# Patient Record
Sex: MALE | Race: WHITE
[De-identification: names, ages, dates, MRNs, and addresses within clinical notes are randomized per-mention and may not be internally consistent; named-entity substitution may affect disease eponyms.]

---

## 2019-10-26 NOTE — NUR
PT PLEASANT COOP A.O. OFTEN ANXIOUS. FEELS SOB AT TIMES. H/R IRREG. NO  MURMER
NOTED. PER TELE AFLUTTER . CURRENTLY ON CARDIZEM DRIP AT 15. LUNGS
WHEEZY T/O. DIM IN BASES. ON 3L O3 OXIMIZER. BT X4 LAST BM THIS AM. LOOSE PER
PT. ALMOST DIARRHEA. VIODS RAJAN CATH. YELLOW FLUID DRAINING. 1 ASST TO BSC AS
HNEEDED. SOB WITH ACTIVITY. STARTS SPIRAL WHEN SOB AND ANXIETY ESCALATES.
MAKES WORSE. WATCHING H/R THRU TELEMONITOR. BED IN LOW PPOSITION, CALL LITE IN
REACH, CALLS APPROP

## 2019-10-26 NOTE — NUR
SHIFT SUMMARY
ASSUMED CARE OF PT AND RECEIVED REPORT AS PT WAS TRANSFERRED FROM ER AT 0110
HRS. PT ABLE TO TRANSFER SELF, BUT IMPULISVE AND PANICKY IN NATURE AND
MOVEMENT. PT WELCOMED AND ORIENTED TO UNIT AND ROOM WITH NO ISSUES OTHER THAN
SIGNS OF ANXIETY APPROACHING PANIC AT TIMES. PT MEDICATED AND TREATED PER UNIT
PROTOCOL AND MD ORDER, INCLUDING 1 DOSE OF TYLENOL FOR C/O CHRONIC BACK PAIN,
AND CARDIZEM DRIP TO NORMALIZE HEART RATE.  CARDIZEM TITRATED FROM 7 AT
ARRIVAL TO 15 THIS SHIFT; HR NORMALIZED TO 1 HUNDRED-TEENS AND TWENTIES BY
SHIFT CHANGE. PT NEEDED REORIENTING TO SITUATION AND LOCATION ONE TIME WAKING
FROM SLEEP, OTHERWISE ALERT AND ORIENTED, COMPLIANT, AND CONVERSIVE. IT
APPEARS THAT ANXIETY/IMPULSIVITY ARE PT'S BASELINE. PASSED CARE AND REPORT TO
ONCOMING SHIFT W/ PT RESTING IN BED, BED LOCKED AND LOW, CALL LIGHT W/IN
REACH.

## 2019-10-26 NOTE — NUR
PT HAS HAD SEVERAL EPISODES OF PANIC ABOUT "NOW BEING ABLE TO BREATHE" HIS O2
SATS FALL DOWN TO LOW 80"S HE WORKS HARD SHORT BREATHS. IS WHEEZY T/O. TURN O2
UP TO 8-10 ON OXIMITRY. RECOVERS IN 1-2 MIN. THEN TURN DOWN. ANXIETY
SKYROCKETS. RT CALLED, OFTEN BREATHING TX GIVEN. DR HAS BEEN NOTIFIED. PENDING
ATIVAN ORDER TO ATTEMPT TO SEE IF WILL HELP. H/R JUST DROPPED TO 80-90.
TURNED CARDIZEM DRIP DOWN TO 5. PO CARDIZEM AND METOPROLOL HAVE BEEN STARTED.
BED IN LOW POSITOIN, CALL LITE IN REACH, CALLS APPROP

## 2019-10-27 NOTE — NUR
SHIFT SUMMARY
ASSUMED CARE OF PT AT 1900 HRS, PT LYING AWAKE IN BED APPEARING ANXIOUS AND
IMPULSIVE WHICH IS PT'S BASELINE. PT HAD MOMENTS OF ANXIETY TO PANIC T/O THIS
SHIFT REQUIRING INTERVENTION BY NURSING STAFF AND RT. PT WAS SUPPLIED WITH
OXIMIZER AT BEGINNING OF SHIFT WHICH ALLOWED EFFECTIVE TITRATION OF O2 FLOW TO
MEET PT'S NEEDS WITHOUT OVERSATURATION. PT'S CARDIZEM DRIP WAS TURNED FROM
5 MG/HR TO OFF AROUND 2200 HRS AS HIS HR WAS STABLE BELOW 80 BPM. CARDIZEM
DRIP WAS TURNED BACK ON AROUND MIDNIGHT TO 15 MG/HR IN RESPONSE TO
PANIC-ACCENTUATED RAISE IN HR, AND TITRATED TO 10 AROUND 0300 IN RESPONSE TO
HR FALLING TO 90'S - 110'S. PT HAS SIGNIFICANT OXYGEN NEEDS WHEN EXCITED
AND/OR ANXIOUS, WHICH HAPPENS ABRUPTLY T/O THE SHIFT, RESOLVING JUST AS
QUICKLY. QUICK RESPONSE, AND CALM EXPLANATION OF INTERVENTIONS SEEMS TO HAVE A
GREAT PACIFYING EFFECT ON PT'S EMOTIONAL STATE. WILL CONTINUE TO MONITOR AND
WILL PASS CARE AND REPORT TO ONCOMING SHIFT AT 0700. BED LOCKED AND LOW, CALL
LIGHT W/IN REACH, PT AWAKE IN BED WATCHING TV. SATS AT 95%.

## 2019-10-28 NOTE — NUR
ASSUMED CARE APPROXIMATELY 0700; PT ALERT; UP IN CHAIR; TOLERATING HIFLOW
WELL, ADJUSTED TO 45L, 35% PER RT; TYLENOL FOR BACK PAIN, REPOSITIONING, HEAT
PAD AS TOLERATED ; CARDIZEM GTT STOPPED @1120  PER PHYSICIAN ORDERS; NEW
ORDERS FOR EXTENDED RELEASE PO CARDIZEM; PT VSS STABLE; PT SEEMS IMPROVED
SINCE YESTERDAY; RAJAN DRAINING APPROPRIATELY; CALL LIGHT IN REACH; WILL
CONTINUE TO MONITOR AND ASSESS UNTIL HAND OFF TO NOC RN.

## 2019-10-28 NOTE — NUR
UPDATE
PT UP IN CHAIR; VSS; PT SEEMS CONTENT TODAY; PLEASANT AND COMPLIANT W/ CARE;
ANXIOUS AT TIMES AND NEEDS REASSURED WITH ANSWERS TO HIS QUESTIONS; A&O x4;
RAJAN DRAINING YELLOW; WILL CONTINUE TO MONITOR.

## 2019-10-28 NOTE — NUR
Pt visit this evening. Pt reports 7/10 pain in his back and states pain in
normal for him. Pt reports significant anxiety due to being in the hospital
for so long. He is requesting a breathing treatment at this time. Instructed
Pt request will be made to his bedside nurse. Pt is agreeable for continued
visit tomorrow.
 
Spoke with night bedside nurse Judy, relayed Pt's request for breathing
treatment and discussed case.
 
Palliative Care will F/U with Pt tomorrow.

## 2019-10-28 NOTE — NUR
SHIFT SUMMARY
 
PT TOLERATED HIFLO N.C. WELL THROUGH NIGHT, 45 L/MIN, 35% O2 UNLESS
OTHERWISE NOTED BY RESPIRATORY. ANXIETY WELL CONTROLLED WITH GUIDED IMAGERY,
EG. BREATHE IN THROUGH YOUR NOSE, AS SOON AS YOU REACH YOUR PEAK RELEASE THE
BREATH. WHEN BREATHING, FOCUS ON A SLOW, EVEN DRAW, BUT NEVER TO THE POINT OF
DISCOMFORT. O2 SAT DROPS TO 75% WITH ACTIVITY, QUICKLY RECOVERED INTO
MID-90S. TYLENOL ONCE FOR BACK PAIN. VSS. WILL CONTINUE TO MONITOR.

## 2019-10-29 NOTE — NUR
CARE ASSUMPTION
 
PT A&O X4, PLEASANT AND COOPERATIVE. VSS. MONITOR SHOWS AFIB, -110. SPO2
> 92% ON 6L HIGH FLOW CANNULA, TITRATED TO 4L HF W/ PT TOLERATING WELL AT
REST. PT INITIALLY IN RECLINER CHAIR, O2 INCREASED BACK TO 6L FOR AMBULATION
BACK TO BED D/T DESAT TO 85%. PT W/ QUICK RECOVERY TO SPO2 > 92%, O2 TITRATED
BACK TO 4L HF. WILL CONTINUE TO MONITOR AND PROVIDE CARE.

## 2019-10-29 NOTE — NUR
Pt visit this afternoon. Pt sitting in recliner upon arrival. Pt reports 7/10
pain in his back with Pt stating rating is normal for him. Engaged in
therapeutic disucssion regarding Advanced Care Planning. Pt reports living at
[place] in the independent living section. Pt reports using a walker to
assist in ambulation and is independent of his ADL's at [facility]. Pt reports
lately he becomes dyspneic after ambulating 10 feet and requires rest to
recover. Educated Pt on disease process including trajectory of disease.
Educated on the importance of routine conversations with PCP and specialist in
order to plan accordingly. Educated on hospice as an option for future long
term plan. Pt reports no other converns at this time.
 
Palliative Care will remain available.

## 2019-10-29 NOTE — NUR
SHIFT SUMMARY
PT A&Ox3, CALM AND COOPERATIVE WITH CARE. PT RESTING IN BED THIS AM, UP TO
CHAIR THIS AFTERNOON. PT REPORTS LOWER BACK PAIN, LIDOCAIN PATCH IN PLACE AND
MEDICATED x1 WITH TYLENOL WITH POSTIVE RESULTS. PT SOB WITH EXERTION, STARTED
THIS AM ON AIRVO 35L O2 29% FIO2, TRANSITIONED TO 8L O2 VIA NC, AND TITRATED
TO 6L O2 VIA NC. PER H&P PT HAS HOME O2 AT 2L O2 VIA NC. LS WHEEZE T/O. PT
DENIES NAUSEA. AFIB/AFLUTTER 'S T/O SHIFT PER TELEMETRY. VSS. NO OTHER
ACUTE CHANGES NOTED DURING SHIFT. WILL CONTINUE TO MONITOR UNTIL REPORT GIVEN
TO ONCOMING RN.

## 2019-10-29 NOTE — NUR
SHIFT SUMMARY
PT SLEEPING IN ROOM COMFORTABLY AT THIS TIME. NO ACUTE CHANGES IN STATUS T/O
NIGHT PT SLEPT WELL IN SHORT PERIODS. RESP EVEN AND TACHYPNIC AT TIMES ON
AIRVO. PT WAS TITRATED DOWN FROM 45L AIRVO TO 35L AND TOLERATING WELL. SATS
>92%. PT ANXIOUS AT TIMES AND REPORTS SOB AT THESE TIMES. PT WAS ALSO VERY
TACHYCARDIC DURING START OF SHIFT WITH HR RANGING INTO 160'S. PRN DOSE OF
CARDIZEM WAS GIVEN AND PT HR SLOWED TO 80-90'S AND CONVERTED FROM AFLUTTER TO
AFIB. PT REMAINED ASYMPTOMATIC OF TACHYCARDIA. PT WAS SBA TO BSC DURING NIGHT,
CHRONIC RAJAN IN PLACE DRAIING CLEAR YELLOW URINE. PT USES CALL LIGHT
APPROPRIATELY. CALL LIGHT IN REACH.

## 2019-10-30 NOTE — NUR
NOTIFIED DR HEATH OF RESULTS OF HOME O2 EVAL, 4L AT REST AND 6L WITH ACTIVITY.
NEW ORDERS TO CONTINUE WITH DISCHARGE. CARE MANAGEMENT NOTIFIED TO CONTACT
LITA FOR INCREASED OXYGEN NEEDS. [facility] TRANSPORT SET UP AT 1415. WILL
CONTINUE TO MONITOR.

## 2019-10-30 NOTE — NUR
SHIFT SUMMARY
 
PT CONTINUES TO BE A&O X4, VSS. SPO2 > 90% ON 3-6L HI-FLOW NC. PT REQUIRING
MORE OXYGEN W/ EATING/DRINKING & REPOSITIONING. PT REMINDED TO TAKE BREAKS FOR
DEEP BREATHING WHILE SITTING UP EATING/DRINKING. MONITOR SHOWS AFIB, HR
100-110 W/ BRIEF, NONSUSTAINED TOUCH UPS 'S W/ RETURN TO -110. PT
IN BED W/ CALL LIGHT IN REACH. RAJAN CATH PATENT AND DRAINING CLEAR YELLOW
URINE. WILL CONTINUE TO MONITOR AND PROVIDE CARE UNTIL REPORT OFF TO DAY SHIFT
RN.

## 2019-10-30 NOTE — NUR
DISCHARGE SUMMARY
PT A&Ox4. ANXIOUS AT TIMES, COOPERATIVE WITH CARE. SBA TO CHAIR/BSC. PT
REPORTS PAIN IN LOWER BACK, MEDICATED WITH LIDOCAINE PATCH AND TYLENOLx1 WITH
POSITIVE RESULTS. PT SOB WITH EXERTION, 4L O2 VIA NC AT REST, 6L O2 VIA NC
WITH ANY ACTIVITY PER HOME O2 EVAL. PT EDUCATED ON CHANGE IN O2 USEAGE, CARE
MANAGEMENT TAKING CARE OF NOTIFING [facility] AND [facility]. PT ENCOURAGED TO USE IS AND
FLUTTER. PT EDUCATED ON SMOKING CESSATION. PT HAS CHRONIC RAJAN IN FOR
RETENTION, PATENT AND DRAINING. VSS. NO OTHER ACUTE CHANGES NOTED DURING
SHIFT. PT EDUCATED ON DISCHARGE INSTRUCTION, MEDICATIONS, FOLLOW UP
APPOINTMENTS. PRESCRIPTIONS, H&P, AND LAST PROGR[facility] TO [facility]. PT LEFT
ROOM VIA WHEELCHAIR WITH [facility] TRANSPORTATION AT 1420.

## 2019-10-30 NOTE — NUR
CALLED DR HEATH FOR CLARIFICATION OF OXYGEN USE AT HOME, NEW ORDERS FOR HOME O2
EVAL, IF PT NEEDS 6L OR GREATER WILL NOTIFY DR HEATH.

## 2019-11-05 NOTE — NUR
SHIFT SUMMARY
ASSUMED CARE AT APPROXIMATELY 1715. PT ALERT AND ORIENTED. 'S UPON
ADMIT, BUT NOW RANGING FROM 110-120'S. BP STABLE. CARDIZEM GTT AT 15ML/H. O2
SATS REMAIN ABOVE 90% ON 4L NC. PT STATES HE USES 6L AT BASELINE. LS COARSE
THROUGHOUT. EDEMA TO BLE 2+ PITTING. LEGS ELEVATED AT THIS TIME. PT HAS
CHRONIC RAJAN AND REPORTS UROLOGIST PLACED IT. BAG HAS BEEN CHANGED. WILL SEND
UA. PT ABLE TO AMBULATE WITH WALKER AT BASELINE, BUT ON BEDREST UNTIL HR IS
CONTROLLED. WILL CONTINUE TO MONITOR AND REPORT TO ONCOMING RN. CALL LIGHT
IN REACH.

## 2019-11-05 NOTE — NUR
INCREASED HR
PT WITH INCREASED HEART RATE AVERAGING IN THE 150s PER TELEMETRY. PT FEELING
INCREASED DYSPNEA. VALSALVA MANEUVER ATTEMPTED X2 WITH NO DECREASE IN HR.
LOPRESSOR 5MG IVP GIVEN WITH NOTED STABLE BP. HR DECREASED TO AVERAGING IN THE
80s WITHIN 5 MINUTES POST ADMINISTRATION. CARDIZEM DRIP REMAINS INFUSING PER
PROTOCOL. PT REPORTS IMPROVED BREATHING PATTERN WITH DECREASE IN HR. PT
APPEARS TO BE MUCH MORE RELAXED AT THIS TIME. REPORTS DECREASED IN FEELINGS OF
ANXIETY AS WELL. WILL CONTINUE TO CLOSELY.

## 2019-11-05 NOTE — NUR
HR HOLDING IN 80'S WHILE WAITING FOR MED FROM PHARMACY SO STARTED AT 5 RATHER
THAN 15 WILL CONTINUE TO MONITOR

## 2019-11-06 NOTE — NUR
NURSING PCU DAYSHIFT SUMMARY:
 
No significant changes noted t/o the shift. VS have remained stable,
respiratory status unchanged, O2 sat upper 90's on 5L NC, humidified air
placed. Good u/o noted w/diuretics. Pt remains anxious though this has
improved t/o shift. No s/s of acute distress at this time, cont to monitor
until rpt is given to NOC RN.

## 2019-11-06 NOTE — NUR
NURSING PCU DAYSHIFT:
 
Assumed care of pt at approx 0700. A/O, very anxious though fairly cooperative
w/care, requires frequent reassurance. Skin is fragile w/mild scattered
bruising, no breakdown noted. General weakness noted, xfers to commode/chair
w/one staff assist and FWW.
 
Tele in place, aflutter/afib w/HR , no c/o CP/pressure, BP stable, 1+
BLE edema. L/S w/exp wheezes in BUL and crackles present in LLL, dyspnea
w/minimal exertion, O2 sat mid 90's at rest though mid 80's w/minimal
movement, respirations rapid and shallow, continuous bedside O2 monitoring.
Abd mildly distended, BT+, c/o nausea, chronic FC in place and draining well.
PIV x2, diltiazem gtt infusing at 5cc/hr.
 
No s/s of acute distress at this time. Seen by PMD, new d/o received. PO
diltiazem to be started, gtt will be titrated off approx 1 hr after
administration. Pt denies any current needs or questions regarding plan of
care. Call light in reach, pt is able to use w/o difficulty, cont to monitor
for any changes.

## 2019-11-07 NOTE — NUR
Pt has advance directive completed [facility] will contact his
POA to see if they have a copy

## 2019-11-07 NOTE — NUR
transfer order
received notification pt has a medical floor bed. Pt is sitting in bedside
recliner finishing his lunch w/ no stated and no apparent needs. Will continue
to monitor until passing care and report to med floor RN.

## 2019-11-07 NOTE — NUR
RESPIRTORY DISTRESS
PATIENT BECOME SOB AND PALE. SAO2 DROPPED TO THE 60S ON 10 LPM HIGH FLOW
CANNULA. RESPIRTORY THERAPIST CALLED. RESPIRTORY THERAPIST CALL PROVIDER AND
AIRVO ORDERED. PATIENT PLACED ON AIRVO AT 40 LPM AND FIO2 OF 40%. NO FURTHER
EVENETS NOTED THIS SHIFT AND PATENETS BIOX HAS BEEN 95% OR GREATER
ON THE AIRVO.

## 2019-11-07 NOTE — NUR
pt has poa [facility] records May veda munguia [phone]. no answer will keep
trying. [facility] is also trying to reach her.

## 2019-11-07 NOTE — NUR
PCU NOC SHIFT SUMMARY
PATIENT REMAINS ALERT AND ORIENTED T/O SHIFT X4. PATIENT REMAINED IN AFIB T/O
SHIFT T/O SHIFT IN  RANGE. PATIENT SBA TO BEDSIDE COMMODE, RAJAN CATH
IN PLACE DRAINING DARK YELLOW URINE, LEFT IN PLACE DUE TO RETENETION ISSUES
PER NOTES. PATIENT REPORTS CHRONIC LOWER BACK PAIN RELIEVED WITH MEDICATION
PER EMAR. PATIENTS LUNG SOUNDS WHEEZE TO DIM. NO FURTHER EVENTS NOTED. CALL
LIGHT W/I REACH. SIDE RAILS UP X3. WILL CONTINUE TO MONITOR AND GIVE REPORT TO
DAYSHIFT RN.

## 2019-11-07 NOTE — NUR
SHIFT SUMMARY:
PATIENT TRANSFER FROM [facility] THIS SHIFT. PT C/O BACK PAIN - RELIEVED WITH
NORCO. PT REMAINS ON AIRVO @ 40L. TELE IN PLACE - A-FIB @  PER TELE
TECH.RAJAN IN PLACE FOR RETENTION; PATENT AND DRAINING.PT UP WITH SBA TO BSC.
DIURESIS CONTINUING. WCTM.

## 2019-11-08 NOTE — NUR
Shift Summary
 
Patient slept well overnight. He had some c/o pain to the low back and left
intercostal/chest wall pain. Chest wall pain was worse with positioning and
did not ratiate. He remains of high-flow O2 -- 40L at 40%. No c/o SOB at rest.

## 2019-11-08 NOTE — NUR
PT ALERT AND ORIENTED THROUGHOUT THE SHIFT. PT UP IN CHAIR FOR BREAKFAST AND
LUNCH. PT ON 6L O2 THIS SHIFT. PT DESATS WITH ACTIVITY OR EATING.
TITRATED OFF 10L ON AIRVO THIS AM. PT WORKED WITH PT/OT, COOPERATIVE WITH
CARE. PT CURRENTLY SITTING UP IN BED EATING DINNER, WATCHING TV. WILL CONTINUE
TO MONITOR.

## 2019-11-09 NOTE — NUR
PATIENT STARTED THE SHIFT WITH 12 L OXYGEN, AND -150. AFTER
PATIENT WENT TO SLEEP, HR DROPPED AND HIS O2 WAS ABLE TO BE DECREASED
TO 6L.
PATIENT HAS BEEN ON 6L NASAL CANULA AND HAD O2 SATS IN THE 90'S SINCE 2300. HE
WAS GIVEN A WARM PACK TO HELP HIM WITH CHEST WALL PAIN. NO OTHER ACUTE
CHANGES.

## 2019-11-09 NOTE — NUR
patient complaining of strong pleuritic type chest pain with deep breath. also
thick sputum with moderate amount of stringy looking blood tinged
substance intermingled in white mucus. patient states he's been coughing it up
off and on since this afternoon.  lung sounds diminished throughout with
coarse crackles in upper airways.  will continue close monitoring and
encouraged patient to let staff know if this persists.  tramadol given for
chest pain and warm blanket to chest for comfort.

## 2019-11-09 NOTE — NUR
SHIFT SUMMARY
 
NO ACUTE CHANGES. PATIENT MEDICATED X 1 FOR BACK PAIN. PATIENT ON 6L NC TO
MAINTAIN OXYGEN SATURATION ABOVE 90%. PATIENT DENIES NAUSEA. PATIENT UP ONE
ASSIST W/FWW TO CHAIR OR BSC. SALINAS WORKED WITH PT TODAY. MITAN UP IN
RECLINER THIS MORNING AND RESTING IN BED THIS AFTERNOON. CALL LIGHT IN REACH.

## 2019-11-10 NOTE — NUR
HE FELT NAUSEATED AT BOTH MEALS SO FAR TODAY. HE ATE ALL HIS BREAKFAST AND
LESS OF HIS LUNCH. NO EMESIS. REGLAN GIVEN DURING LUNCH. TEDS PUT BACK ON
FIRST THING THIS MORNING BEFORE BREAKFAST. O2 4L. CONTINUOUS BIOX ON. CXR
DONE. A [facility] REP VISITED WITH HIM. I SPOKE WITH LITA ON QUINTEN'S BEHALF ABOUT
ANOTHER PORTABLE TANK FOR DISCHARGE. THEY CAN DELIVER WHEN WE ARE READY. THEY
ARE OUT OF [place].

## 2019-11-10 NOTE — NUR
HE HAD NAUSEA TODAY BUT NO EMESIS. O2 6L PER NC HUMIDIFIED. HE HOPES TO GO
HOME TOMORROW. HE HAS A CHRONIC RAJAN THAT DRAINS WELL.

## 2019-11-10 NOTE — NUR
NIGHT SHIFT SUMMARY
 
patient de-satting into low to mid 80's on 6 liters NC prior to going to bed.
after falling asleep, patient remained 88-94 on his baseline 6 liters.  for a
additional information, please see above note regarding blood tinged sputum
and pleuritic pain left chest.

## 2019-11-11 NOTE — NUR
slept wewll. minimal c/o discomfort except hs chronic back pain combined with
pleuritic pain at the same time.  both were resolved within one hour with one
tramadol, repositioning and one norco.  02 sat alarms were signifincantly
fewer than previous night.  patient really looking forward to discharge home.
he does want stafaf to know he will need another 02 canister from his 02
supplier to make it home. (Gabe Saini)  hoping this will happen today

## 2019-11-11 NOTE — NUR
SHIFT SUMMARY
NO CHANGES IN ASSESSMENT AT THIS TIME. PT SEEN BY CARDIOLOGY THIS SHIFT. TO BE
NPO AFTER MIDNIGHT FOR POSSIBLE ABLATION IN THE AM. PT CURRENTLY RESTING IN
BED. CALL LIGHT IN REACH. PT HR IS PRIMARILY BETWEEN , BUT MAY SPIKEBY
TO -130S AT TIMES. OTHER VITALS STABLE. WILL CONTINUE TO MONITOR UNTIL
TURNOVER IS COMPLETE.

## 2019-11-12 NOTE — NUR
SHIFT SUMMARY
PT HAD SUCCESSFUL CARDIOVERSION COMPLETED TODAY. PT CURRENTLY ON TELE RUNNING
SINUS IN THE 80S. PT RECOVERED WELL. CURRENTLY SWALLOWING WELL & TOLERATING
SOFT FOODS. NO OTHER CHANGES IN ASSESSMENT AT THIS TIME. VSS. WILL CONTINUE TO
MONITOR UNTIL TURNOVER IS COMPLETE.

## 2019-11-12 NOTE — NUR
Spiritual care visit conducted. Charge RN, Joe, calls me and informs me that
patient is requesting a pre-surgery prayer. Patient is lying in bed and alert.
Patient immediately asks for prayer for a surgery that is happening today. I
gladly provide prayer. I then engage patient in dialogue about his mekhi and
family background. I listen empathically and provide a calming presence,
anxiety containment, pastoral  and prayer. Patient responds well and
shows signs of reduced stress. I will continue to remain available to patient
and family.

## 2019-11-12 NOTE — NUR
SHIFT SUMMARY
NO ACUTE CHANGES THIS SHIFT. AOX4. HAS DYSPNEA & TACHYPNEA W/ANY EXERTION,
INCLUDING MOVING/REPOSITIONING IN BED. ON 6L O2 VIA HIGH FLOW NC W/SPO2 >90%
WHILE @REST W/O MOVEMENT. REPORTED 4/10 PAIN IN BACK, MEDICATED PER ORDERS.
SATINDER IS PATENT & DRAINING. HAS BEEN NPO SINCE MIDNIGHT FOR PROCEDURE TODAY.
CALL LIGHT IN REACH.

## 2019-11-12 NOTE — NUR
PT MEDS.
SPOKE WITH DR. QUINTERO THIS AM. DR. MOJICA'ED GIVING MORNING MEDS PRIOR TO
PROCEDURE. WILL CONTINUE TO MONITOR.

## 2019-11-12 NOTE — NUR
PT RECEIVED NONI/CARDIOVERSION, JOANNE WELL, PT WAKES TO STIMULI NOW, VSS W O2
THERAPY ONGOING. REPORT GIVEN TO Orville BURT

## 2019-11-12 NOTE — NUR
CARDIOVERSION COMPLETED
CARDIOVERSION COMPLETE. PT IN SINUS RHYTHM IN THE 80S AT THIS TIME. VSS. HEART
CENTER NURSE IN ROOM WITH PT FOR RECOVERY. WILL CONTINUE TO MONITOR.

## 2019-11-12 NOTE — NUR
CHEST PAIN
PT REPORTING 8/10 CHEST PAIN. PT DECRIBES "SOME PRESSURE" WITH THE PAIN. PT
STATES HIS L ARM IS SLIGHTLY TINGLY. VITALS TAKEN & STABLE. DR. QUINTERO CALLED &
MESSAGE LEFT. DR. ORELLANA CALLED & 0.4 MG NITRO OT ORDERED. WILL MEDICATED PT,
MONITOR BP & CHEST PAIN.

## 2019-11-12 NOTE — NUR
PT ON TELE.
DR. QUINTERO CALLED & INFORMED OF NEW EKG. TELE ORDERED & APPLIED. PT CURRENTLY
IN SINUS RHYTHM AT 84.

## 2019-11-12 NOTE — NUR
PT BACK ON 6L O2
PT BACK ON 6L O2 VIA NC. PT WAS ON AIRVO WITH RESPIRATORY THRERAPY FOR
CARDIOVERSION. PT SATING IN THE 90S  NOW ON 6L, WHICH IS PT BASELINE.

## 2019-11-13 NOTE — NUR
SHIFT SUMMARY
NO ACUTE CHANGES THIS SHIFT. AOX4. VSS. REPORTS HE DOESN'T FEEL AS DYSPNIC &
BREATHING "IS MUCH BETTER." CONT PULSE OX IN PLACE. ON 6L HUMIDIFIED O2 VIA
HIGH FLOW NC, SPO2 DROPS TO HIGH 80'S W/AMBULATION & TRANSFERS. REPORTS MILD
BACK PAIN & WAS MEDICATED W/ULTRAM PER ORDERS. RAJAN IS PATENT & DRAINING
CLEAR YELLOW URINE. CALL LIGHT IN REACH.

## 2019-11-13 NOTE — NUR
Spiritual care visit conducted. Patient is sitting on bedside commode but
insists that it is ok with him that I visit. I provide prayer and cut my visit
short, stating that I will come back a visit another time.

## 2020-03-17 ENCOUNTER — HOSPITAL ENCOUNTER (INPATIENT)
Dept: HOSPITAL 95 - ER | Age: 70
LOS: 6 days | Discharge: HOME | DRG: 641 | End: 2020-03-23
Attending: HOSPITALIST | Admitting: HOSPITALIST
Payer: OTHER GOVERNMENT

## 2020-03-17 VITALS — HEIGHT: 67.01 IN | WEIGHT: 130.01 LBS | BODY MASS INDEX: 20.4 KG/M2

## 2020-03-17 DIAGNOSIS — I50.32: ICD-10-CM

## 2020-03-17 DIAGNOSIS — Z85.118: ICD-10-CM

## 2020-03-17 DIAGNOSIS — R45.851: ICD-10-CM

## 2020-03-17 DIAGNOSIS — E86.0: ICD-10-CM

## 2020-03-17 DIAGNOSIS — J44.9: ICD-10-CM

## 2020-03-17 DIAGNOSIS — I25.10: ICD-10-CM

## 2020-03-17 DIAGNOSIS — F17.210: ICD-10-CM

## 2020-03-17 DIAGNOSIS — G89.29: ICD-10-CM

## 2020-03-17 DIAGNOSIS — Z59.0: ICD-10-CM

## 2020-03-17 DIAGNOSIS — M54.9: ICD-10-CM

## 2020-03-17 DIAGNOSIS — I48.0: ICD-10-CM

## 2020-03-17 DIAGNOSIS — F33.9: ICD-10-CM

## 2020-03-17 DIAGNOSIS — Z92.3: ICD-10-CM

## 2020-03-17 DIAGNOSIS — I27.20: ICD-10-CM

## 2020-03-17 DIAGNOSIS — Z99.81: ICD-10-CM

## 2020-03-17 DIAGNOSIS — E16.2: Primary | ICD-10-CM

## 2020-03-17 DIAGNOSIS — C77.9: ICD-10-CM

## 2020-03-17 LAB
ALBUMIN SERPL BCP-MCNC: 4.1 G/DL (ref 3.4–5)
ALBUMIN/GLOB SERPL: 1.3 {RATIO} (ref 0.8–1.8)
ALT SERPL W P-5'-P-CCNC: 29 U/L (ref 12–78)
ANION GAP SERPL CALCULATED.4IONS-SCNC: 11 MMOL/L (ref 6–16)
APAP SERPL-MCNC: <2 UG/ML (ref 10–30)
AST SERPL W P-5'-P-CCNC: 26 U/L (ref 12–37)
BASOPHILS # BLD AUTO: 0.07 K/MM3 (ref 0–0.23)
BASOPHILS NFR BLD AUTO: 0 % (ref 0–2)
BILIRUB SERPL-MCNC: 0.7 MG/DL (ref 0.1–1)
BUN SERPL-MCNC: 19 MG/DL (ref 8–24)
CALCIUM SERPL-MCNC: 9.2 MG/DL (ref 8.5–10.1)
CHLORIDE SERPL-SCNC: 101 MMOL/L (ref 98–108)
CO2 SERPL-SCNC: 23 MMOL/L (ref 21–32)
CREAT SERPL-MCNC: 0.69 MG/DL (ref 0.6–1.2)
DEPRECATED RDW RBC AUTO: 52.9 FL (ref 35.1–46.3)
EOSINOPHIL # BLD AUTO: 0.01 K/MM3 (ref 0–0.68)
EOSINOPHIL NFR BLD AUTO: 0 % (ref 0–6)
ERYTHROCYTE [DISTWIDTH] IN BLOOD BY AUTOMATED COUNT: 15.9 % (ref 11.7–14.2)
ETHANOL SERPL-MCNC: <3 MG/DL
GLOBULIN SER CALC-MCNC: 3.2 G/DL (ref 2.2–4)
GLUCOSE SERPL-MCNC: 41 MG/DL (ref 70–99)
HCT VFR BLD AUTO: 41.7 % (ref 37–53)
HGB BLD-MCNC: 13.9 G/DL (ref 13.5–17.5)
IMM GRANULOCYTES # BLD AUTO: 0.26 K/MM3 (ref 0–0.1)
IMM GRANULOCYTES NFR BLD AUTO: 1 % (ref 0–1)
KETONES UR STRIP-MCNC: (no result) MG/DL
LEUKOCYTE ESTERASE UR QL STRIP: (no result)
LYMPHOCYTES # BLD AUTO: 0.45 K/MM3 (ref 0.84–5.2)
LYMPHOCYTES NFR BLD AUTO: 1 % (ref 21–46)
MCHC RBC AUTO-ENTMCNC: 33.3 G/DL (ref 31.5–36.5)
MCV RBC AUTO: 91 FL (ref 80–100)
MONOCYTES # BLD AUTO: 1.44 K/MM3 (ref 0.16–1.47)
MONOCYTES NFR BLD AUTO: 5 % (ref 4–13)
NEUTROPHILS # BLD AUTO: 29.07 K/MM3 (ref 1.96–9.15)
NEUTROPHILS NFR BLD AUTO: 93 % (ref 41–73)
NRBC # BLD AUTO: 0 K/MM3 (ref 0–0.02)
NRBC BLD AUTO-RTO: 0 /100 WBC (ref 0–0.2)
PLATELET # BLD AUTO: 166 K/MM3 (ref 150–400)
POTASSIUM SERPL-SCNC: 3.5 MMOL/L (ref 3.5–5.5)
PROT SERPL-MCNC: 7.3 G/DL (ref 6.4–8.2)
PROT UR STRIP-MCNC: (no result) MG/DL
RBC #/AREA URNS HPF: (no result) /HPF (ref 0–2)
SALICYLATES SERPL-MCNC: 2 MG/DL (ref 2.8–20)
SODIUM SERPL-SCNC: 135 MMOL/L (ref 136–145)
SP GR SPEC: 1.01 (ref 1–1.02)
TROPONIN I SERPL-MCNC: <0.015 NG/ML (ref 0–0.04)
UROBILINOGEN UR STRIP-MCNC: (no result) MG/DL
WBC #/AREA URNS HPF: (no result) /HPF (ref 0–5)

## 2020-03-17 PROCEDURE — G0480 DRUG TEST DEF 1-7 CLASSES: HCPCS

## 2020-03-17 PROCEDURE — A9270 NON-COVERED ITEM OR SERVICE: HCPCS

## 2020-03-17 PROCEDURE — U0002 COVID-19 LAB TEST NON-CDC: HCPCS

## 2020-03-17 PROCEDURE — G0378 HOSPITAL OBSERVATION PER HR: HCPCS

## 2020-03-17 SDOH — ECONOMIC STABILITY - HOUSING INSECURITY: HOMELESSNESS: Z59.0

## 2020-03-17 NOTE — NUR
just recieved pt up from er, evaluated and passed information to noc nurse,
bed in low position, saline locked, 02via nc at 2L, call light in reach

## 2020-03-17 NOTE — NUR
PATIENT RESTING IN BED. NO SI INTENTIONS NOTED. AXO X3 COOPERATIVE WITH CARE.
CBG'S Q2:  213, 269, . DENIES PAIN, SOB, AND N/V. ON 4L O2 NC BASELINE.
RAJAN PATIENT AND DRAINING. CALL LIGHT IN REACH.

## 2020-03-18 LAB
ALBUMIN SERPL BCP-MCNC: 3.3 G/DL (ref 3.4–5)
ALBUMIN/GLOB SERPL: 1 {RATIO} (ref 0.8–1.8)
ALT SERPL W P-5'-P-CCNC: 24 U/L (ref 12–78)
ANION GAP SERPL CALCULATED.4IONS-SCNC: 10 MMOL/L (ref 6–16)
AST SERPL W P-5'-P-CCNC: 23 U/L (ref 12–37)
BILIRUB SERPL-MCNC: 0.5 MG/DL (ref 0.1–1)
BUN SERPL-MCNC: 17 MG/DL (ref 8–24)
CALCIUM SERPL-MCNC: 9.2 MG/DL (ref 8.5–10.1)
CHLORIDE SERPL-SCNC: 108 MMOL/L (ref 98–108)
CO2 SERPL-SCNC: 21 MMOL/L (ref 21–32)
CREAT SERPL-MCNC: 0.56 MG/DL (ref 0.6–1.2)
DEPRECATED RDW RBC AUTO: 51.8 FL (ref 35.1–46.3)
ERYTHROCYTE [DISTWIDTH] IN BLOOD BY AUTOMATED COUNT: 15.9 % (ref 11.7–14.2)
GLOBULIN SER CALC-MCNC: 3.3 G/DL (ref 2.2–4)
GLUCOSE SERPL-MCNC: 167 MG/DL (ref 70–99)
HCT VFR BLD AUTO: 37.2 % (ref 37–53)
HGB BLD-MCNC: 12.7 G/DL (ref 13.5–17.5)
LEUKOCYTE ESTERASE UR QL STRIP: (no result)
MCHC RBC AUTO-ENTMCNC: 34.1 G/DL (ref 31.5–36.5)
MCV RBC AUTO: 89 FL (ref 80–100)
NRBC # BLD AUTO: 0 K/MM3 (ref 0–0.02)
NRBC BLD AUTO-RTO: 0 /100 WBC (ref 0–0.2)
PLATELET # BLD AUTO: 127 K/MM3 (ref 150–400)
POTASSIUM SERPL-SCNC: 3.8 MMOL/L (ref 3.5–5.5)
PROT SERPL-MCNC: 6.6 G/DL (ref 6.4–8.2)
RBC #/AREA URNS HPF: (no result) /HPF (ref 0–2)
SODIUM SERPL-SCNC: 139 MMOL/L (ref 136–145)
SP GR SPEC: 1 (ref 1–1.02)
UROBILINOGEN UR STRIP-MCNC: (no result) MG/DL
WBC #/AREA URNS HPF: (no result) /HPF (ref 0–5)

## 2020-03-18 NOTE — NUR
SHIFT SUMMARY
PATIENT HAD NO ACUTE CHANGES OBSERVED. AXOX 3 AND INDEPENDENT IN ROOM. PIV
REMAINS INTACT. ON 4L O2 NC. CBG Q2 RANGE: 193-265. INDWELLING RAJAN PATENT
AND DRAINING. PSYCH CONSULT SENT IN. NO SI INTENTION NOTED. VSS/AFEBRILE.
DENIES PAIN, SOB, AND N/V. DROPLET PRECAUTIONS. COOPERATIVE WITH CARE. CALL
LIGHT IN REACH. WILL CONTINUE TO MONITOR UNTIL DAY SHIFT NURSE ASSUMES CARE.

## 2020-03-18 NOTE — NUR
Initial spiritual care note:
 
Mr. Bennett was talkative and welcoming of companionship.  He tells me he has
been thinking about killing himself.  When asked 'why' he lists: a recurrance
of metastatic cancer, recent homelesnes, inability to get medical attention,
and lonliness.  He responded well to emotional affirmation and .  I
will remani available.

## 2020-03-19 LAB
BASOPHILS # BLD AUTO: 0.04 K/MM3 (ref 0–0.23)
BASOPHILS NFR BLD AUTO: 0 % (ref 0–2)
DEPRECATED RDW RBC AUTO: 54.2 FL (ref 35.1–46.3)
EOSINOPHIL # BLD AUTO: 0.08 K/MM3 (ref 0–0.68)
EOSINOPHIL NFR BLD AUTO: 1 % (ref 0–6)
ERYTHROCYTE [DISTWIDTH] IN BLOOD BY AUTOMATED COUNT: 16.2 % (ref 11.7–14.2)
HCT VFR BLD AUTO: 37.2 % (ref 37–53)
HGB BLD-MCNC: 12.2 G/DL (ref 13.5–17.5)
IMM GRANULOCYTES # BLD AUTO: 0.06 K/MM3 (ref 0–0.1)
IMM GRANULOCYTES NFR BLD AUTO: 0 % (ref 0–1)
LYMPHOCYTES # BLD AUTO: 1.4 K/MM3 (ref 0.84–5.2)
LYMPHOCYTES NFR BLD AUTO: 10 % (ref 21–46)
MCHC RBC AUTO-ENTMCNC: 32.8 G/DL (ref 31.5–36.5)
MCV RBC AUTO: 91 FL (ref 80–100)
MONOCYTES # BLD AUTO: 0.92 K/MM3 (ref 0.16–1.47)
MONOCYTES NFR BLD AUTO: 6 % (ref 4–13)
NEUTROPHILS # BLD AUTO: 11.8 K/MM3 (ref 1.96–9.15)
NEUTROPHILS NFR BLD AUTO: 83 % (ref 41–73)
NRBC # BLD AUTO: 0 K/MM3 (ref 0–0.02)
NRBC BLD AUTO-RTO: 0 /100 WBC (ref 0–0.2)
PLATELET # BLD AUTO: 165 K/MM3 (ref 150–400)

## 2020-03-19 NOTE — NUR
SUMMARY
PT IS A/O X4, HE HAS BEEN PLEASANT/COOPERATIVE T/O DAY, STATE SOME LINGERING
DEPRESSION HOWEVER NO SI @ THIS TIME. STATE GOOD SLEEP LAST NOC AFTER DR BARRETT ADJUST TRAZADONE. STATE HOPEFUL [facility] TO [facility]
WHEN APPROP. HE HAS BEEN UP & SHOWERED TODAY. CONTINUES ISOLATED IN ROOM
PENDING RESULTS OF COVID19 TESTS. AFEBRILE, WBC ELEVATED HOWEVER IMPROVED @
14.3, DR AMBRIZ STOPPED ANTIBX TODAY. LUNGS CLEAR/DECREASED, OCCASIONAL NP
COUGH, O2 @ 3L HOME DOSE. RAJAN CATH FOR RETENTION, URINE IS CLEAR YELLOW.
BLOOD SUGARS HAVE BEEN WNL SO FAR TODAY, 75 & 107, PT HAS GOOD APPETITE.

## 2020-03-19 NOTE — NUR
SHIFT SUMMARY
ASSUMED CARE OF PT AT 1900. PT IS A/O X4, PT STATES HE HAS SOME NUMBNESS IN
HIS HANDS, HE DOESNT KNOW WHY. HEART SOUNDS REGULAR, DENIES CP. LUNG SOUNDS
TIGHT, WITH INSPIRATORY WHEEZING AND COURSE CRACKLES, WEARS 3L CONTINUOUSLY,
SATURATION AT 99%, DENIES SOB AT THIS TIME. PT EMPTIES CATHETER INDEPENDENTLY,
URINE YELLOW AND CLEAR.
 
NO ACUTE EVENTS DURING THE NIGHT. PT ALL NIGHT. CALL LIGHT IN REACH, BED IN
LOWEST POSTION, WILL COITNUE TO MONITOR UNTIL DAYSHIFT NURSE ARRIVES.

## 2020-03-20 NOTE — NUR
SHIFT SUMMARY:
NO ACUTE CHANGES TO REPORT THIS SHIFT. PT A&O; CALM AND COOPERATIVE WITH CARE;
NO SI NOTED THIS SHIFT. MEDICATED FOR PAIN & NAUSEA PER EMAR. POC GLUCOSE
VARIABLE  THIS SHIFT. PT IN ISOLATION FOR R/O COVID-19; AWAITING
RESULTS. PT MEDICALLY STABLE FOR DISCHARGE; AWAITING SAFE DISCHARGE PLAN.
WCTM.

## 2020-03-20 NOTE — NUR
SHIFT SUMMARY
ASSUMED CARE OF PT AT 1900. PT IS A/O X4, STATES HE HAS N/T IN HIS
EXTREMITIES. HEART SOUNDS REGULAR, LUNG SOUNDS HAVE COARSE CRACKLES, PT HAS
ACTIVE LUNG CANCER, PT ON 3L NC, SATURATIONS AT 99%, DENIES SOB/CP AT THIS
TIME. PT HAS CHRONIC CATH, CATHETER DRAINING WITH GRAVITY, URINE CLEAR AND
YELLOW. PT IS VERY EXCITED ABOUT HOPEFULLY GOING HOME TO A FACILITY IN [place]
UNTIL HE CAN GET INTO THE [facility] HERE, PT HOPES TO GET RULE-OUT RESULTS
IN THE AM SO THAT HE CAN BE DISCHARGED.
 
NO ACUTE EVENTES DURING THE NIGHT, THE PT SLEPT MOST OF THE NIGHT. CALL LIGHT
IN REACH, BED IN LOWEST POSTION, WILL CONTINUE TO MONITOR UNTIL DAYSHIFT NURSE
ARRIVES.

## 2020-03-21 NOTE — NUR
SHIFT SUMMARY
ASSUMED CARE OF PT AT 1900. PT IS A/O X4, STATES HE HAS NUMBNESS IN HIS LEGS.
PT IS DEPRESSED THINKING ABOUT WHERE HE WILL GO TOMORROW WHEN HE IS
DISCHARGED. PT STATES HE DOESNT WANT TO GO BACK [facility] BE[facility]USE [facility] A shelter. PT CURRENTLY HAS NOWHERE TO GO WHEN DISCHARGED, [facility]T TAKE
PT FOR [facility] AND [facility]. HEART SOUNDS
REGULAR, LUNG SOUNDS CLEAR, PT ON 3L NC, DENIES CP/SOB AT THIS TIME. PT C/O
BACK AND HEADACHE PAIN, MEDICATED PER EMAR. CATHETER DRAINING TO GRAVITY,
URINE CLEAR AND YELLOW.
 
NO ACUTE EVENTS OVER NIGHT. PT SLEPT ALL NIGHT, CALL LIGHT IN REACH, BED IN
LOWEST POSTION, WILL CONTINUE TO MONITOR UNTIL DAYSHIFT NURSE ARRIVES.

## 2020-03-21 NOTE — NUR
SUMMARY- PT ALERT AND ORIENTED. INDEPENDANT IN THE ROOM. UP TO BATHROOM IN AM
HAD A MED SOFT BM. TOLERATING FOOD AND FLUIDS. VSS, AFIBRILE. MEDICATED X2
WITH TYLENOL, EFFECTIVE FOR C/O HEADACHE. PT JUST WAITING FOR CLEAR OF VIRAL
FLU, AND HOPING TO BE DC'D. JUST WORRIED ABOUT PAYING FOR HOTEL. BLOOD SUGARS
IN NORMAL LIMIT. AM SUGAR HIGH AS RN FORGET TO CHECK UNTIL AFTER BREAKFAST.

## 2020-03-21 NOTE — NUR
At 2232, this CNA rounded on 305. At this time the patient was on his right
side asleep, and his folley looked to be okay. The nurse is doing primary care
for this patient due to mask shortages, and the CNA is not doing any patient
care unless needed.

## 2020-03-22 LAB
DEPRECATED RDW RBC AUTO: 52.7 FL (ref 35.1–46.3)
ERYTHROCYTE [DISTWIDTH] IN BLOOD BY AUTOMATED COUNT: 15.5 % (ref 11.7–14.2)
HCT VFR BLD AUTO: 44 % (ref 37–53)
HGB BLD-MCNC: 14.2 G/DL (ref 13.5–17.5)
MCHC RBC AUTO-ENTMCNC: 32.3 G/DL (ref 31.5–36.5)
MCV RBC AUTO: 92 FL (ref 80–100)
NRBC # BLD AUTO: 0 K/MM3 (ref 0–0.02)
NRBC BLD AUTO-RTO: 0 /100 WBC (ref 0–0.2)
PLATELET # BLD AUTO: 193 K/MM3 (ref 150–400)

## 2020-03-22 NOTE — NUR
SHIFT SUMAMRY:
PT A&O; CALM AND COOPERATIVE WITH CARE; INDEPENDENT IN ROOM. MEDICATED FOR
BACK & HA PAIN PER EMAR. NAUSEA X1 THIS SHIFT; PRN ZOFRAN GIVEN. PER LAB, PT
IS COVID-19 NEGATIVE. MEDICALLY STABLE; AWAITING PLACEMENT. REPORT GIVEN TO
ONCOMING RN.

## 2020-03-22 NOTE — NUR
SHIFT SUMMARY
ASSUMED CARE OF PT AT 1900. PT IS A/O X4, STATES HE HAS N/T IN ALL
EXTREMITIES. HEART SOUNDS REGULAR, LUNG SOUNDS WHEEZES AND CRACKLES AT THE
BASES, PT IS CURRENTLY ON 3L NC, DENIES SOB/CP AT THIS TIME. PT C/O PAIN IN
HIS LOW BACK AND HEAD, MEDICATED PER EMAR. CATHETER DRAINING WITH GRAVITY,
URINE CLEAR AND YELLOW. PT STATES THAT HE CAN'T WAIT UNTIL HE IS OFF
ISOLATION. PT STATES THAT HE IS GOING TO A MOTEL AT DISCHARGE, PT DOESN KNOW
WHO IS PAYING FOR IT AND HE SAID THAT HE COULDNT.
 
NO ACUTE EVENTS DURING THE NIGHT. PT SLEPT T/O THE NIGHT. CALL LIGHT IN REACH,
BED IN LOWEST POSTION, WILL CONTINUE TO MONITOR UNTIL DAYSHIFT NURSE ARRIVES.

## 2020-03-23 NOTE — NUR
REVIEW [facility]. [facility] PROVIDING RIDE TO [facility] AND GET OXYGEN. AWARE
NEEDS TO MAKE APPT W/WHITE TEAM. AWARE EUGENE IS WORKING TO GET HIM A PLACE TO
STAY. ANSWER ALL QUESTIONS. AWAITING TAXI.

## 2020-03-23 NOTE — NUR
NIGHT SHIFT SUMMARY
 PT A/O X4. PLEASANT AND CALLS APPROPRIATELY. DENIES ANY THOUGHTS ABOUT
HURTING HIMSELF. VSS. NO ACUTE CHANGES. RAJAN PATENT AND DRAINING. AWAITING
PLACEMENT. DENIES CHEST PAIN, NAUSEA, SOB. CALL LIGHT WITHIN REACH.

## 2020-03-25 ENCOUNTER — HOSPITAL ENCOUNTER (EMERGENCY)
Dept: HOSPITAL 95 - ER | Age: 70
LOS: 1 days | Discharge: HOME | End: 2020-03-26
Payer: OTHER GOVERNMENT

## 2020-03-25 VITALS — BODY MASS INDEX: 19.62 KG/M2 | WEIGHT: 125 LBS | HEIGHT: 67 IN

## 2020-03-25 DIAGNOSIS — I48.91: ICD-10-CM

## 2020-03-25 DIAGNOSIS — J44.9: ICD-10-CM

## 2020-03-25 DIAGNOSIS — E87.6: Primary | ICD-10-CM

## 2020-03-25 DIAGNOSIS — Z79.899: ICD-10-CM

## 2020-03-25 DIAGNOSIS — F17.210: ICD-10-CM

## 2020-03-25 DIAGNOSIS — K59.00: ICD-10-CM

## 2020-03-25 DIAGNOSIS — Z85.118: ICD-10-CM

## 2020-03-25 DIAGNOSIS — Z85.3: ICD-10-CM

## 2020-03-25 LAB
BASOPHILS # BLD AUTO: 0.04 K/MM3 (ref 0–0.23)
BASOPHILS NFR BLD AUTO: 1 % (ref 0–2)
DEPRECATED RDW RBC AUTO: 50.4 FL (ref 35.1–46.3)
EOSINOPHIL # BLD AUTO: 0.08 K/MM3 (ref 0–0.68)
EOSINOPHIL NFR BLD AUTO: 1 % (ref 0–6)
ERYTHROCYTE [DISTWIDTH] IN BLOOD BY AUTOMATED COUNT: 15 % (ref 11.7–14.2)
HCT VFR BLD AUTO: 36.3 % (ref 37–53)
HGB BLD-MCNC: 12.1 G/DL (ref 13.5–17.5)
IMM GRANULOCYTES # BLD AUTO: 0.06 K/MM3 (ref 0–0.1)
IMM GRANULOCYTES NFR BLD AUTO: 1 % (ref 0–1)
LYMPHOCYTES # BLD AUTO: 1.04 K/MM3 (ref 0.84–5.2)
LYMPHOCYTES NFR BLD AUTO: 15 % (ref 21–46)
MCHC RBC AUTO-ENTMCNC: 33.3 G/DL (ref 31.5–36.5)
MCV RBC AUTO: 92 FL (ref 80–100)
MONOCYTES # BLD AUTO: 0.86 K/MM3 (ref 0.16–1.47)
MONOCYTES NFR BLD AUTO: 13 % (ref 4–13)
NEUTROPHILS # BLD AUTO: 4.8 K/MM3 (ref 1.96–9.15)
NEUTROPHILS NFR BLD AUTO: 70 % (ref 41–73)
NRBC # BLD AUTO: 0 K/MM3 (ref 0–0.02)
NRBC BLD AUTO-RTO: 0 /100 WBC (ref 0–0.2)
PLATELET # BLD AUTO: 220 K/MM3 (ref 150–400)

## 2020-03-26 LAB
ALBUMIN SERPL BCP-MCNC: 3.7 G/DL (ref 3.4–5)
ALBUMIN/GLOB SERPL: 1.2 {RATIO} (ref 0.8–1.8)
ALT SERPL W P-5'-P-CCNC: 21 U/L (ref 12–78)
ANION GAP SERPL CALCULATED.4IONS-SCNC: 6 MMOL/L (ref 6–16)
AST SERPL W P-5'-P-CCNC: 22 U/L (ref 12–37)
BILIRUB SERPL-MCNC: 0.3 MG/DL (ref 0.1–1)
BUN SERPL-MCNC: 18 MG/DL (ref 8–24)
CALCIUM SERPL-MCNC: 9.9 MG/DL (ref 8.5–10.1)
CHLORIDE SERPL-SCNC: 102 MMOL/L (ref 98–108)
CO2 SERPL-SCNC: 32 MMOL/L (ref 21–32)
CREAT SERPL-MCNC: 0.85 MG/DL (ref 0.6–1.2)
GLOBULIN SER CALC-MCNC: 3 G/DL (ref 2.2–4)
GLUCOSE SERPL-MCNC: 101 MG/DL (ref 70–99)
INSULIN FREE SERPL-ACNC: 78 UU/ML
INSULIN SERPL-ACNC: 78 UU/ML
MAGNESIUM SERPL-MCNC: 1.9 MG/DL (ref 1.6–2.4)
POTASSIUM SERPL-SCNC: 3 MMOL/L (ref 3.5–5.5)
PROT SERPL-MCNC: 6.7 G/DL (ref 6.4–8.2)
SODIUM SERPL-SCNC: 140 MMOL/L (ref 136–145)

## 2020-03-29 ENCOUNTER — HOSPITAL ENCOUNTER (EMERGENCY)
Dept: HOSPITAL 95 - ER | Age: 70
Discharge: HOME | End: 2020-03-29
Payer: OTHER GOVERNMENT

## 2020-03-29 VITALS — HEIGHT: 67 IN | BODY MASS INDEX: 18.05 KG/M2 | WEIGHT: 114.99 LBS

## 2020-03-29 DIAGNOSIS — F17.210: ICD-10-CM

## 2020-03-29 DIAGNOSIS — J44.9: ICD-10-CM

## 2020-03-29 DIAGNOSIS — R11.2: Primary | ICD-10-CM

## 2020-03-29 DIAGNOSIS — Z79.899: ICD-10-CM

## 2020-03-29 DIAGNOSIS — Z79.51: ICD-10-CM

## 2020-03-29 DIAGNOSIS — Z79.01: ICD-10-CM

## 2020-03-29 DIAGNOSIS — E86.0: ICD-10-CM

## 2020-03-29 DIAGNOSIS — I48.91: ICD-10-CM

## 2020-03-29 LAB
ALBUMIN SERPL BCP-MCNC: 4.1 G/DL (ref 3.4–5)
ALBUMIN/GLOB SERPL: 1.4 {RATIO} (ref 0.8–1.8)
ALT SERPL W P-5'-P-CCNC: 29 U/L (ref 12–78)
ANION GAP SERPL CALCULATED.4IONS-SCNC: 6 MMOL/L (ref 6–16)
AST SERPL W P-5'-P-CCNC: 29 U/L (ref 12–37)
BASOPHILS # BLD AUTO: 0.03 K/MM3 (ref 0–0.23)
BASOPHILS NFR BLD AUTO: 0 % (ref 0–2)
BILIRUB SERPL-MCNC: 0.6 MG/DL (ref 0.1–1)
BUN SERPL-MCNC: 20 MG/DL (ref 8–24)
CALCIUM SERPL-MCNC: 9.5 MG/DL (ref 8.5–10.1)
CHLORIDE SERPL-SCNC: 96 MMOL/L (ref 98–108)
CO2 SERPL-SCNC: 32 MMOL/L (ref 21–32)
CREAT SERPL-MCNC: 0.74 MG/DL (ref 0.6–1.2)
DEPRECATED RDW RBC AUTO: 50.9 FL (ref 35.1–46.3)
EOSINOPHIL # BLD AUTO: 0.05 K/MM3 (ref 0–0.68)
EOSINOPHIL NFR BLD AUTO: 1 % (ref 0–6)
ERYTHROCYTE [DISTWIDTH] IN BLOOD BY AUTOMATED COUNT: 14.7 % (ref 11.7–14.2)
GLOBULIN SER CALC-MCNC: 3 G/DL (ref 2.2–4)
GLUCOSE SERPL-MCNC: 96 MG/DL (ref 70–99)
HCT VFR BLD AUTO: 36.8 % (ref 37–53)
HGB BLD-MCNC: 12.2 G/DL (ref 13.5–17.5)
IMM GRANULOCYTES # BLD AUTO: 0.02 K/MM3 (ref 0–0.1)
IMM GRANULOCYTES NFR BLD AUTO: 0 % (ref 0–1)
LYMPHOCYTES # BLD AUTO: 1.16 K/MM3 (ref 0.84–5.2)
LYMPHOCYTES NFR BLD AUTO: 17 % (ref 21–46)
MCHC RBC AUTO-ENTMCNC: 33.2 G/DL (ref 31.5–36.5)
MCV RBC AUTO: 93 FL (ref 80–100)
MONOCYTES # BLD AUTO: 0.66 K/MM3 (ref 0.16–1.47)
MONOCYTES NFR BLD AUTO: 9 % (ref 4–13)
NEUTROPHILS # BLD AUTO: 5.1 K/MM3 (ref 1.96–9.15)
NEUTROPHILS NFR BLD AUTO: 73 % (ref 41–73)
NRBC # BLD AUTO: 0 K/MM3 (ref 0–0.02)
NRBC BLD AUTO-RTO: 0 /100 WBC (ref 0–0.2)
PLATELET # BLD AUTO: 199 K/MM3 (ref 150–400)
POTASSIUM SERPL-SCNC: 3.1 MMOL/L (ref 3.5–5.5)
PROT SERPL-MCNC: 7.1 G/DL (ref 6.4–8.2)
SODIUM SERPL-SCNC: 134 MMOL/L (ref 136–145)

## 2020-04-28 ENCOUNTER — HOSPITAL ENCOUNTER (EMERGENCY)
Dept: HOSPITAL 95 - ER | Age: 70
Discharge: HOME | End: 2020-04-28
Payer: OTHER GOVERNMENT

## 2020-04-28 VITALS — WEIGHT: 120 LBS | BODY MASS INDEX: 19.99 KG/M2 | HEIGHT: 65 IN

## 2020-04-28 DIAGNOSIS — F17.200: ICD-10-CM

## 2020-04-28 DIAGNOSIS — N39.0: Primary | ICD-10-CM

## 2020-04-28 DIAGNOSIS — K59.00: ICD-10-CM

## 2020-04-28 DIAGNOSIS — J43.9: ICD-10-CM

## 2020-04-28 DIAGNOSIS — I48.91: ICD-10-CM

## 2020-04-28 DIAGNOSIS — Z79.899: ICD-10-CM

## 2020-04-28 DIAGNOSIS — Z79.51: ICD-10-CM

## 2020-04-28 DIAGNOSIS — Z79.01: ICD-10-CM

## 2020-04-28 LAB
ALBUMIN SERPL BCP-MCNC: 3 G/DL (ref 3.4–5)
ALBUMIN/GLOB SERPL: 1 {RATIO} (ref 0.8–1.8)
ALT SERPL W P-5'-P-CCNC: 23 U/L (ref 12–78)
ANION GAP SERPL CALCULATED.4IONS-SCNC: 4 MMOL/L (ref 6–16)
AST SERPL W P-5'-P-CCNC: 16 U/L (ref 12–37)
BASOPHILS # BLD AUTO: 0.03 K/MM3 (ref 0–0.23)
BASOPHILS NFR BLD AUTO: 0 % (ref 0–2)
BILIRUB SERPL-MCNC: 0.3 MG/DL (ref 0.1–1)
BUN SERPL-MCNC: 23 MG/DL (ref 8–24)
CALCIUM SERPL-MCNC: 8.6 MG/DL (ref 8.5–10.1)
CHLORIDE SERPL-SCNC: 104 MMOL/L (ref 98–108)
CO2 SERPL-SCNC: 32 MMOL/L (ref 21–32)
CREAT SERPL-MCNC: 0.66 MG/DL (ref 0.6–1.2)
DEPRECATED RDW RBC AUTO: 57.3 FL (ref 35.1–46.3)
EOSINOPHIL # BLD AUTO: 0.04 K/MM3 (ref 0–0.68)
EOSINOPHIL NFR BLD AUTO: 0 % (ref 0–6)
ERYTHROCYTE [DISTWIDTH] IN BLOOD BY AUTOMATED COUNT: 15.6 % (ref 11.7–14.2)
GLOBULIN SER CALC-MCNC: 3 G/DL (ref 2.2–4)
GLUCOSE SERPL-MCNC: 84 MG/DL (ref 70–99)
HCT VFR BLD AUTO: 34 % (ref 37–53)
HGB BLD-MCNC: 11 G/DL (ref 13.5–17.5)
IMM GRANULOCYTES # BLD AUTO: 0.04 K/MM3 (ref 0–0.1)
IMM GRANULOCYTES NFR BLD AUTO: 0 % (ref 0–1)
LEUKOCYTE ESTERASE UR QL STRIP: (no result)
LYMPHOCYTES # BLD AUTO: 0.45 K/MM3 (ref 0.84–5.2)
LYMPHOCYTES NFR BLD AUTO: 5 % (ref 21–46)
MCHC RBC AUTO-ENTMCNC: 32.4 G/DL (ref 31.5–36.5)
MCV RBC AUTO: 101 FL (ref 80–100)
MONOCYTES # BLD AUTO: 0.92 K/MM3 (ref 0.16–1.47)
MONOCYTES NFR BLD AUTO: 10 % (ref 4–13)
NEUTROPHILS # BLD AUTO: 7.69 K/MM3 (ref 1.96–9.15)
NEUTROPHILS NFR BLD AUTO: 84 % (ref 41–73)
NRBC # BLD AUTO: 0 K/MM3 (ref 0–0.02)
NRBC BLD AUTO-RTO: 0 /100 WBC (ref 0–0.2)
PLATELET # BLD AUTO: 211 K/MM3 (ref 150–400)
POTASSIUM SERPL-SCNC: 3.9 MMOL/L (ref 3.5–5.5)
PROT SERPL-MCNC: 6 G/DL (ref 6.4–8.2)
PROT UR STRIP-MCNC: (no result) MG/DL
RBC #/AREA URNS HPF: (no result) /HPF (ref 0–2)
SODIUM SERPL-SCNC: 140 MMOL/L (ref 136–145)
SP GR SPEC: 1.01 (ref 1–1.02)
UROBILINOGEN UR STRIP-MCNC: (no result) MG/DL
WBC #/AREA URNS HPF: (no result) /HPF (ref 0–5)

## 2020-05-02 ENCOUNTER — HOSPITAL ENCOUNTER (EMERGENCY)
Dept: HOSPITAL 95 - ER | Age: 70
Discharge: HOME | End: 2020-05-02
Payer: OTHER GOVERNMENT

## 2020-05-02 VITALS — WEIGHT: 125 LBS | HEIGHT: 67 IN | BODY MASS INDEX: 19.62 KG/M2

## 2020-05-02 DIAGNOSIS — J44.9: ICD-10-CM

## 2020-05-02 DIAGNOSIS — Z79.899: ICD-10-CM

## 2020-05-02 DIAGNOSIS — F17.210: ICD-10-CM

## 2020-05-02 DIAGNOSIS — Z79.01: ICD-10-CM

## 2020-05-02 DIAGNOSIS — Z79.51: ICD-10-CM

## 2020-05-02 DIAGNOSIS — R30.0: Primary | ICD-10-CM

## 2020-05-02 DIAGNOSIS — I48.91: ICD-10-CM

## 2020-07-19 ENCOUNTER — HOSPITAL ENCOUNTER (EMERGENCY)
Dept: HOSPITAL 95 - ER | Age: 70
LOS: 1 days | Discharge: NURSING, REHAB OR LONG-TERM CARE | End: 2020-07-20
Payer: OTHER GOVERNMENT

## 2020-07-19 VITALS — WEIGHT: 114.99 LBS | HEIGHT: 67 IN | BODY MASS INDEX: 18.05 KG/M2

## 2020-07-19 DIAGNOSIS — Z20.828: ICD-10-CM

## 2020-07-19 DIAGNOSIS — F17.210: ICD-10-CM

## 2020-07-19 DIAGNOSIS — Z79.2: ICD-10-CM

## 2020-07-19 DIAGNOSIS — F41.8: ICD-10-CM

## 2020-07-19 DIAGNOSIS — Z85.3: ICD-10-CM

## 2020-07-19 DIAGNOSIS — M54.5: ICD-10-CM

## 2020-07-19 DIAGNOSIS — Z79.899: ICD-10-CM

## 2020-07-19 DIAGNOSIS — J44.1: Primary | ICD-10-CM

## 2020-07-19 DIAGNOSIS — I48.0: ICD-10-CM

## 2020-07-19 DIAGNOSIS — G89.29: ICD-10-CM

## 2020-07-19 DIAGNOSIS — I27.20: ICD-10-CM

## 2020-07-19 LAB
ALBUMIN SERPL BCP-MCNC: 3.7 G/DL (ref 3.4–5)
ALBUMIN/GLOB SERPL: 1 {RATIO} (ref 0.8–1.8)
ALT SERPL W P-5'-P-CCNC: 31 U/L (ref 12–78)
ANION GAP SERPL CALCULATED.4IONS-SCNC: 5 MMOL/L (ref 6–16)
AST SERPL W P-5'-P-CCNC: 40 U/L (ref 12–37)
BASOPHILS # BLD AUTO: 0.05 K/MM3 (ref 0–0.23)
BASOPHILS NFR BLD AUTO: 1 % (ref 0–2)
BILIRUB SERPL-MCNC: 0.3 MG/DL (ref 0.1–1)
BUN SERPL-MCNC: 22 MG/DL (ref 8–24)
CALCIUM SERPL-MCNC: 9.1 MG/DL (ref 8.5–10.1)
CHLORIDE SERPL-SCNC: 101 MMOL/L (ref 98–108)
CO2 SERPL-SCNC: 30 MMOL/L (ref 21–32)
CREAT SERPL-MCNC: 0.66 MG/DL (ref 0.6–1.2)
DEPRECATED RDW RBC AUTO: 53.4 FL (ref 35.1–46.3)
EOSINOPHIL # BLD AUTO: 0.09 K/MM3 (ref 0–0.68)
EOSINOPHIL NFR BLD AUTO: 1 % (ref 0–6)
ERYTHROCYTE [DISTWIDTH] IN BLOOD BY AUTOMATED COUNT: 15.5 % (ref 11.7–14.2)
GLOBULIN SER CALC-MCNC: 3.8 G/DL (ref 2.2–4)
GLUCOSE SERPL-MCNC: 101 MG/DL (ref 70–99)
HCT VFR BLD AUTO: 40 % (ref 37–53)
HGB BLD-MCNC: 12.3 G/DL (ref 13.5–17.5)
IMM GRANULOCYTES # BLD AUTO: 0.03 K/MM3 (ref 0–0.1)
IMM GRANULOCYTES NFR BLD AUTO: 0 % (ref 0–1)
LYMPHOCYTES # BLD AUTO: 0.85 K/MM3 (ref 0.84–5.2)
LYMPHOCYTES NFR BLD AUTO: 8 % (ref 21–46)
MCHC RBC AUTO-ENTMCNC: 30.8 G/DL (ref 31.5–36.5)
MCV RBC AUTO: 93 FL (ref 80–100)
MONOCYTES # BLD AUTO: 0.94 K/MM3 (ref 0.16–1.47)
MONOCYTES NFR BLD AUTO: 9 % (ref 4–13)
NEUTROPHILS # BLD AUTO: 8.36 K/MM3 (ref 1.96–9.15)
NEUTROPHILS NFR BLD AUTO: 81 % (ref 41–73)
NRBC # BLD AUTO: 0 K/MM3 (ref 0–0.02)
NRBC BLD AUTO-RTO: 0 /100 WBC (ref 0–0.2)
PLATELET # BLD AUTO: 267 K/MM3 (ref 150–400)
POTASSIUM SERPL-SCNC: 4.1 MMOL/L (ref 3.5–5.5)
PROT SERPL-MCNC: 7.5 G/DL (ref 6.4–8.2)
SODIUM SERPL-SCNC: 136 MMOL/L (ref 136–145)
TROPONIN I SERPL-MCNC: <0.015 NG/ML (ref 0–0.04)

## 2020-07-19 PROCEDURE — U0002 COVID-19 LAB TEST NON-CDC: HCPCS

## 2020-08-11 ENCOUNTER — HOSPITAL ENCOUNTER (EMERGENCY)
Dept: HOSPITAL 95 - ER | Age: 70
Discharge: HOME | End: 2020-08-11
Payer: OTHER GOVERNMENT

## 2020-08-11 VITALS — WEIGHT: 110.01 LBS | BODY MASS INDEX: 17.68 KG/M2 | HEIGHT: 66 IN

## 2020-08-11 DIAGNOSIS — R06.02: Primary | ICD-10-CM

## 2020-08-11 DIAGNOSIS — Z85.118: ICD-10-CM

## 2020-08-11 DIAGNOSIS — Z79.899: ICD-10-CM

## 2020-08-11 DIAGNOSIS — I48.91: ICD-10-CM

## 2020-08-11 DIAGNOSIS — Z87.891: ICD-10-CM

## 2020-08-11 DIAGNOSIS — Z85.3: ICD-10-CM

## 2020-08-11 DIAGNOSIS — J44.9: ICD-10-CM

## 2020-09-28 ENCOUNTER — HOSPITAL ENCOUNTER (EMERGENCY)
Dept: HOSPITAL 95 - ER | Age: 70
Discharge: HOME | End: 2020-09-28
Payer: OTHER GOVERNMENT

## 2020-09-28 VITALS — BODY MASS INDEX: 16.32 KG/M2 | WEIGHT: 103.99 LBS | HEIGHT: 67 IN

## 2020-09-28 DIAGNOSIS — J44.9: ICD-10-CM

## 2020-09-28 DIAGNOSIS — Z79.01: ICD-10-CM

## 2020-09-28 DIAGNOSIS — F41.9: ICD-10-CM

## 2020-09-28 DIAGNOSIS — R33.9: Primary | ICD-10-CM

## 2020-09-28 DIAGNOSIS — F32.9: ICD-10-CM

## 2020-09-28 DIAGNOSIS — I48.0: ICD-10-CM

## 2020-09-28 DIAGNOSIS — R10.84: ICD-10-CM

## 2020-09-28 DIAGNOSIS — I50.30: ICD-10-CM

## 2020-09-28 DIAGNOSIS — Z79.51: ICD-10-CM

## 2020-09-28 DIAGNOSIS — Z79.899: ICD-10-CM

## 2020-09-28 LAB
ALBUMIN SERPL BCP-MCNC: 3.6 G/DL (ref 3.4–5)
ALBUMIN/GLOB SERPL: 1.1 {RATIO} (ref 0.8–1.8)
ALT SERPL W P-5'-P-CCNC: 32 U/L (ref 12–78)
ANION GAP SERPL CALCULATED.4IONS-SCNC: 7 MMOL/L (ref 6–16)
AST SERPL W P-5'-P-CCNC: 23 U/L (ref 12–37)
BASOPHILS # BLD AUTO: 0.06 K/MM3 (ref 0–0.23)
BASOPHILS NFR BLD AUTO: 1 % (ref 0–2)
BILIRUB SERPL-MCNC: 0.2 MG/DL (ref 0.1–1)
BUN SERPL-MCNC: 15 MG/DL (ref 8–24)
CALCIUM SERPL-MCNC: 8.9 MG/DL (ref 8.5–10.1)
CHLORIDE SERPL-SCNC: 100 MMOL/L (ref 98–108)
CO2 SERPL-SCNC: 27 MMOL/L (ref 21–32)
CREAT SERPL-MCNC: 0.56 MG/DL (ref 0.6–1.2)
DEPRECATED RDW RBC AUTO: 52.6 FL (ref 35.1–46.3)
EOSINOPHIL # BLD AUTO: 0.04 K/MM3 (ref 0–0.68)
EOSINOPHIL NFR BLD AUTO: 1 % (ref 0–6)
ERYTHROCYTE [DISTWIDTH] IN BLOOD BY AUTOMATED COUNT: 15.5 % (ref 11.7–14.2)
GLOBULIN SER CALC-MCNC: 3.3 G/DL (ref 2.2–4)
GLUCOSE SERPL-MCNC: 92 MG/DL (ref 70–99)
HCT VFR BLD AUTO: 41.9 % (ref 37–53)
HGB BLD-MCNC: 13.6 G/DL (ref 13.5–17.5)
IMM GRANULOCYTES # BLD AUTO: 0.02 K/MM3 (ref 0–0.1)
IMM GRANULOCYTES NFR BLD AUTO: 0 % (ref 0–1)
LYMPHOCYTES # BLD AUTO: 0.77 K/MM3 (ref 0.84–5.2)
LYMPHOCYTES NFR BLD AUTO: 10 % (ref 21–46)
MCHC RBC AUTO-ENTMCNC: 32.5 G/DL (ref 31.5–36.5)
MCV RBC AUTO: 93 FL (ref 80–100)
MONOCYTES # BLD AUTO: 0.79 K/MM3 (ref 0.16–1.47)
MONOCYTES NFR BLD AUTO: 10 % (ref 4–13)
NEUTROPHILS # BLD AUTO: 6.05 K/MM3 (ref 1.96–9.15)
NEUTROPHILS NFR BLD AUTO: 78 % (ref 41–73)
NRBC # BLD AUTO: 0 K/MM3 (ref 0–0.02)
NRBC BLD AUTO-RTO: 0 /100 WBC (ref 0–0.2)
PLATELET # BLD AUTO: 204 K/MM3 (ref 150–400)
POTASSIUM SERPL-SCNC: 4.2 MMOL/L (ref 3.5–5.5)
PROT SERPL-MCNC: 6.9 G/DL (ref 6.4–8.2)
RBC #/AREA URNS HPF: (no result) /HPF (ref 0–2)
SODIUM SERPL-SCNC: 134 MMOL/L (ref 136–145)
SP GR SPEC: 1.01 (ref 1–1.02)
UROBILINOGEN UR STRIP-MCNC: (no result) MG/DL
WBC #/AREA URNS HPF: (no result) /HPF (ref 0–5)

## 2021-06-03 NOTE — NUR
PT UP TO BEDSIDE COMMODE WITH RN ASSIST. POSITIVE VOID. PT SITTING COMFORTABLY
IN BED WATCHING TV. WARM BLANKET GIVEN. TOLERATING ORANGE JUICE. WILL CONTINUE
TO MONITOR.

## 2021-06-03 NOTE — NUR
Patient up to Ambulate independently. Gait steady.
Discharge instructions reviewed with patient. Patient verbalizes understanding.
Copy given to patient to take home.
Discharged via wheelchair to [facility]. PAULA,
CARMEL SPOKE WITH SAULO IN [facility] AND HE STATED THERE WAS NO RECOVERY TIME REQUIREMENT PRIOR TO PT D/C
AND
PICKUP. DR. ADAME WAS NOTIFIED AND OKAYED DC.

## 2021-12-24 NOTE — NUR
SUMMARY
PT IS A/O X4, PLEASANT/CHEERFUL, STATE NO SI TODAY, STATE COMES & GOES, STATE
WILL NOT ATTEMPT TO HURT HIMSELF WHILE IN HOSP. DR BARRETT IN FOR PSYCHE
CONSULT, STATE LOW RISK SUICIDE, MAKE CHANGES TO MEDICATIONS, [facility]. THIS AM BLOOD SUGARS HAD BEEN IMPROVED, CBG'S CHANGED TO
AC/HS. HE HAS HAD LOWER BLOOD SUGARS THIS AFTERNOON, 65 & 75, WILL HAVE NOC RN
MX CLOSELY. WBC ELEVATED 25.9, IV ROCEPHIN CONTINUES. LUNGS CLEAR, PT STATE
CHR USE OF O2 3-4L, @ 3L BIOX 97% PROCEDURE RN CHANGE RAJAN CATH/PROTOCOL,
SEND U/A. DROPLET ISO CONTINUES R/T R/O COVID19. RESP PANEL WAS NEG. Coping Well
